# Patient Record
Sex: FEMALE | HISPANIC OR LATINO | Employment: STUDENT | URBAN - METROPOLITAN AREA
[De-identification: names, ages, dates, MRNs, and addresses within clinical notes are randomized per-mention and may not be internally consistent; named-entity substitution may affect disease eponyms.]

---

## 2018-04-21 ENCOUNTER — APPOINTMENT (OUTPATIENT)
Dept: RADIOLOGY | Facility: MEDICAL CENTER | Age: 18
End: 2018-04-21
Attending: EMERGENCY MEDICINE
Payer: MEDICAID

## 2018-04-21 ENCOUNTER — HOSPITAL ENCOUNTER (EMERGENCY)
Facility: MEDICAL CENTER | Age: 18
End: 2018-04-21
Attending: EMERGENCY MEDICINE
Payer: MEDICAID

## 2018-04-21 VITALS
HEART RATE: 70 BPM | RESPIRATION RATE: 16 BRPM | OXYGEN SATURATION: 97 % | SYSTOLIC BLOOD PRESSURE: 109 MMHG | DIASTOLIC BLOOD PRESSURE: 71 MMHG | TEMPERATURE: 99.1 F | WEIGHT: 114.2 LBS

## 2018-04-21 DIAGNOSIS — R10.13 EPIGASTRIC PAIN: ICD-10-CM

## 2018-04-21 LAB
ALBUMIN SERPL BCP-MCNC: 4.1 G/DL (ref 3.2–4.9)
ALBUMIN/GLOB SERPL: 1.3 G/DL
ALP SERPL-CCNC: 39 U/L (ref 30–99)
ALT SERPL-CCNC: 14 U/L (ref 2–50)
ANION GAP SERPL CALC-SCNC: 9 MMOL/L (ref 0–11.9)
APPEARANCE UR: ABNORMAL
AST SERPL-CCNC: 19 U/L (ref 12–45)
BACTERIA #/AREA URNS HPF: ABNORMAL /HPF
BASOPHILS # BLD AUTO: 0.3 % (ref 0–1.8)
BASOPHILS # BLD: 0.02 K/UL (ref 0–0.12)
BILIRUB SERPL-MCNC: 0.6 MG/DL (ref 0.1–1.5)
BILIRUB UR QL STRIP.AUTO: NEGATIVE
BUN SERPL-MCNC: 17 MG/DL (ref 8–22)
CALCIUM SERPL-MCNC: 9.3 MG/DL (ref 8.5–10.5)
CHLORIDE SERPL-SCNC: 106 MMOL/L (ref 96–112)
CO2 SERPL-SCNC: 25 MMOL/L (ref 20–33)
COLOR UR: YELLOW
CREAT SERPL-MCNC: 0.8 MG/DL (ref 0.5–1.4)
EOSINOPHIL # BLD AUTO: 0.1 K/UL (ref 0–0.51)
EOSINOPHIL NFR BLD: 1.4 % (ref 0–6.9)
EPI CELLS #/AREA URNS HPF: NEGATIVE /HPF
ERYTHROCYTE [DISTWIDTH] IN BLOOD BY AUTOMATED COUNT: 41.3 FL (ref 35.9–50)
GLOBULIN SER CALC-MCNC: 3.2 G/DL (ref 1.9–3.5)
GLUCOSE SERPL-MCNC: 108 MG/DL (ref 65–99)
GLUCOSE UR STRIP.AUTO-MCNC: NEGATIVE MG/DL
HCG SERPL QL: NEGATIVE
HCT VFR BLD AUTO: 40.8 % (ref 37–47)
HGB BLD-MCNC: 14 G/DL (ref 12–16)
HYALINE CASTS #/AREA URNS LPF: ABNORMAL /LPF
IMM GRANULOCYTES # BLD AUTO: 0.02 K/UL (ref 0–0.11)
IMM GRANULOCYTES NFR BLD AUTO: 0.3 % (ref 0–0.9)
KETONES UR STRIP.AUTO-MCNC: NEGATIVE MG/DL
LEUKOCYTE ESTERASE UR QL STRIP.AUTO: ABNORMAL
LIPASE SERPL-CCNC: 20 U/L (ref 11–82)
LYMPHOCYTES # BLD AUTO: 1.91 K/UL (ref 1–4.8)
LYMPHOCYTES NFR BLD: 26.4 % (ref 22–41)
MCH RBC QN AUTO: 31 PG (ref 27–33)
MCHC RBC AUTO-ENTMCNC: 34.3 G/DL (ref 33.6–35)
MCV RBC AUTO: 90.3 FL (ref 81.4–97.8)
MICRO URNS: ABNORMAL
MONOCYTES # BLD AUTO: 0.37 K/UL (ref 0–0.85)
MONOCYTES NFR BLD AUTO: 5.1 % (ref 0–13.4)
NEUTROPHILS # BLD AUTO: 4.81 K/UL (ref 2–7.15)
NEUTROPHILS NFR BLD: 66.5 % (ref 44–72)
NITRITE UR QL STRIP.AUTO: POSITIVE
NRBC # BLD AUTO: 0 K/UL
NRBC BLD-RTO: 0 /100 WBC
PH UR STRIP.AUTO: 6 [PH]
PLATELET # BLD AUTO: 267 K/UL (ref 164–446)
PMV BLD AUTO: 10.6 FL (ref 9–12.9)
POTASSIUM SERPL-SCNC: 3.8 MMOL/L (ref 3.6–5.5)
PROT SERPL-MCNC: 7.3 G/DL (ref 6–8.2)
PROT UR QL STRIP: NEGATIVE MG/DL
RBC # BLD AUTO: 4.52 M/UL (ref 4.2–5.4)
RBC # URNS HPF: ABNORMAL /HPF
RBC UR QL AUTO: NEGATIVE
SODIUM SERPL-SCNC: 140 MMOL/L (ref 135–145)
SP GR UR STRIP.AUTO: 1.02
UROBILINOGEN UR STRIP.AUTO-MCNC: 0.2 MG/DL
WBC # BLD AUTO: 7.2 K/UL (ref 4.8–10.8)
WBC #/AREA URNS HPF: ABNORMAL /HPF

## 2018-04-21 PROCEDURE — 76705 ECHO EXAM OF ABDOMEN: CPT

## 2018-04-21 PROCEDURE — 80053 COMPREHEN METABOLIC PANEL: CPT

## 2018-04-21 PROCEDURE — 81001 URINALYSIS AUTO W/SCOPE: CPT

## 2018-04-21 PROCEDURE — 87077 CULTURE AEROBIC IDENTIFY: CPT

## 2018-04-21 PROCEDURE — 85025 COMPLETE CBC W/AUTO DIFF WBC: CPT

## 2018-04-21 PROCEDURE — 83690 ASSAY OF LIPASE: CPT

## 2018-04-21 PROCEDURE — 99285 EMERGENCY DEPT VISIT HI MDM: CPT

## 2018-04-21 PROCEDURE — 96376 TX/PRO/DX INJ SAME DRUG ADON: CPT

## 2018-04-21 PROCEDURE — 700102 HCHG RX REV CODE 250 W/ 637 OVERRIDE(OP): Performed by: EMERGENCY MEDICINE

## 2018-04-21 PROCEDURE — 96374 THER/PROPH/DIAG INJ IV PUSH: CPT

## 2018-04-21 PROCEDURE — 36415 COLL VENOUS BLD VENIPUNCTURE: CPT

## 2018-04-21 PROCEDURE — A9270 NON-COVERED ITEM OR SERVICE: HCPCS | Performed by: EMERGENCY MEDICINE

## 2018-04-21 PROCEDURE — 87186 SC STD MICRODIL/AGAR DIL: CPT

## 2018-04-21 PROCEDURE — 700111 HCHG RX REV CODE 636 W/ 250 OVERRIDE (IP): Performed by: EMERGENCY MEDICINE

## 2018-04-21 PROCEDURE — 87086 URINE CULTURE/COLONY COUNT: CPT

## 2018-04-21 PROCEDURE — 84703 CHORIONIC GONADOTROPIN ASSAY: CPT

## 2018-04-21 PROCEDURE — 700105 HCHG RX REV CODE 258: Performed by: EMERGENCY MEDICINE

## 2018-04-21 RX ORDER — ONDANSETRON 2 MG/ML
4 INJECTION INTRAMUSCULAR; INTRAVENOUS ONCE
Status: COMPLETED | OUTPATIENT
Start: 2018-04-21 | End: 2018-04-21

## 2018-04-21 RX ORDER — ONDANSETRON 4 MG/1
4 TABLET, FILM COATED ORAL EVERY 8 HOURS PRN
Qty: 6 TAB | Refills: 1 | Status: SHIPPED | OUTPATIENT
Start: 2018-04-21 | End: 2020-09-13

## 2018-04-21 RX ORDER — SODIUM CHLORIDE 9 MG/ML
1000 INJECTION, SOLUTION INTRAVENOUS ONCE
Status: COMPLETED | OUTPATIENT
Start: 2018-04-21 | End: 2018-04-21

## 2018-04-21 RX ADMIN — LIDOCAINE HYDROCHLORIDE 30 ML: 20 SOLUTION OROPHARYNGEAL at 09:58

## 2018-04-21 RX ADMIN — SODIUM CHLORIDE 1000 ML: 9 INJECTION, SOLUTION INTRAVENOUS at 12:15

## 2018-04-21 RX ADMIN — ONDANSETRON 4 MG: 2 INJECTION, SOLUTION INTRAMUSCULAR; INTRAVENOUS at 14:06

## 2018-04-21 RX ADMIN — ONDANSETRON 4 MG: 2 INJECTION, SOLUTION INTRAMUSCULAR; INTRAVENOUS at 10:42

## 2018-04-21 ASSESSMENT — PAIN SCALES - GENERAL
PAINLEVEL_OUTOF10: 2
PAINLEVEL_OUTOF10: 8
PAINLEVEL_OUTOF10: 6
PAINLEVEL_OUTOF10: 4

## 2018-04-21 NOTE — ED NOTES
Pt verbalized relief of n/v. States much better.  Discharge instructions given to pt including returning in 24 hrs for persistent pain.  Questions answered by RN. Discharged w/steady gait and stable vitals.

## 2018-04-21 NOTE — DISCHARGE INSTRUCTIONS
Abdominal Pain, Possible Early Appendicitis  Abdominal (belly) pain can be caused by many things. Your caregiver decides the seriousness of your pain by an exam and possibly blood tests and X-rays. Many cases can be observed and treated at home. Most abdominal pain in children is functional. This means it is not caused by a disease. It will probably improve without treatment.  At this time, your caregiver feels that the abdominal pain could possibly be caused by early appendicitis. This means that you will require follow-up. You may be allowed to go home but may need to return for re-examination and repeat lab work.   HOME CARE INSTRUCTIONS   · Do not take or give laxatives unless directed by your caregiver.   · Take pain medication only if ordered by your caregiver.   · Take no food or water by mouth unless instructed to do so by your caregiver.   SEEK IMMEDIATE MEDICAL CARE IF:   · The pain does not go away or becomes much worse.   · An oral temperature above 102° F (38.9° C) develops.   · Repeated vomiting occurs.   · Blood is being passed in stools (bright red or black tarry stools).   · You develop blood in the urine or cannot pass your urine.   · You develop severe pain in other parts of your body.   MAKE SURE YOU:   · Understand these instructions.   · Will watch your condition.   · Will get help right away if you are not doing well or get worse.   Document Released: 01/06/2009 Document Revised: 03/11/2013 Document Reviewed: 01/06/2009  [facility] Patient Information ©2013 [employer].

## 2018-04-21 NOTE — ED NOTES
Pt verbalized relief of epigastric pain, states that her mid abdomen still hurts and still has the nausea. Will notify erp.

## 2018-04-21 NOTE — ED PROVIDER NOTES
ED Provider Note    Scribed for Julianna Rodriguez M.D. by Kerri Rodriguez. 4/21/2018, 9:41 AM.    Primary care provider: No primary care provider on file.  Means of arrival: Walk in   History obtained from: patient   History limited by: none       CHIEF COMPLAINT  Chief Complaint   Patient presents with   • Abdominal Pain     Pt reports mid abd pain when pt woke today/ descibed as a cramp that comes and goes/ pt reports +vomiting a couple times as well   • N/V       HPI  Jenn Sky is a 17 y.o. female who presents to the Emergency Department for evaluation of intermittent sharp and burning upper abdominal pain onset two hours ago. Patient states her pain was initially to her umbilical area but has since spread to her upper abdomen where her pain is currently the greatest. She denies radiation of her pain to her lower abdomen. Her abdominal pain is not exacerbated with eating. Patient confirms feeling normal yesterday and reports no recent intake of abnormal foods. She has associated vomiting. Patient denies diarrhea, vaginal discharge and bleeding and pain with urination. Patient has no concern for STDs at this time. Her last menstrual period ended a few days ago.       REVIEW OF SYSTEMS  Pertinent positives include upper abdominal pain, vomiting.   Pertinent negatives include no diarrhea, vaginal discharge, dysuria, diarrhea.   All other systems reviewed and negative. C.       PAST MEDICAL HISTORY  No history of UTI or STDs      SURGICAL HISTORY  patient denies any surgical history      SOCIAL HISTORY  Accompanied by boyfriend.       FAMILY HISTORY  None noted       CURRENT MEDICATIONS  Home Medications    **Home medications have not yet been reviewed for this encounter**         ALLERGIES  No Known Allergies        PHYSICAL EXAM  VITAL SIGNS: /71   Pulse (!) 118   Temp 37.3 °C (99.1 °F)   Resp 20   Wt 51.8 kg (114 lb 3.2 oz)   SpO2 99%   Constitutional:   Sitting up on gurney and appears  uncomfortable, able to answer questions  HENT: Nose is normal in appearance without rhinorrhea, external ears are normal,  moist mucous membranes  Eyes: Anicteric,  pupils are equal round and reactive, there is no conjunctival drainage or pallor   Neck: The trachea is midline, there is no obvious mass or meningeal signs  Cardiovascular: Equal radial pulsation, regular rate and rhythm without murmurs gallops or rubs  Thorax & Lungs: Respiratory rate and effort are normal. There is normal chest excursion with respiration.  No wheezes rhonchi or rales noted.  Abdomen: Abdomen is normal in appearance,  normal bowel sounds, no pain with cough, nonpulsatile  :  No CVA tenderness to palpation  Musculoskeletal: No deformities noted in all 4 extremities. Actively moves all 4 extremities  Skin: Visualized skin is warm, no erythema, no rash.  Neurologic:  Cranial nerves II through XII are intact there is no focal abnormality noted.  Psychiatric: Normal mood and mentation      DIAGNOSTIC STUDIES / PROCEDURES  LABS  Results for orders placed or performed during the hospital encounter of 04/21/18   CBC WITH DIFFERENTIAL   Result Value Ref Range    WBC 7.2 4.8 - 10.8 K/uL    RBC 4.52 4.20 - 5.40 M/uL    Hemoglobin 14.0 12.0 - 16.0 g/dL    Hematocrit 40.8 37.0 - 47.0 %    MCV 90.3 81.4 - 97.8 fL    MCH 31.0 27.0 - 33.0 pg    MCHC 34.3 33.6 - 35.0 g/dL    RDW 41.3 35.9 - 50.0 fL    Platelet Count 267 164 - 446 K/uL    MPV 10.6 9.0 - 12.9 fL    Neutrophils-Polys 66.50 44.00 - 72.00 %    Lymphocytes 26.40 22.00 - 41.00 %    Monocytes 5.10 0.00 - 13.40 %    Eosinophils 1.40 0.00 - 6.90 %    Basophils 0.30 0.00 - 1.80 %    Immature Granulocytes 0.30 0.00 - 0.90 %    Nucleated RBC 0.00 /100 WBC    Neutrophils (Absolute) 4.81 2.00 - 7.15 K/uL    Lymphs (Absolute) 1.91 1.00 - 4.80 K/uL    Monos (Absolute) 0.37 0.00 - 0.85 K/uL    Eos (Absolute) 0.10 0.00 - 0.51 K/uL    Baso (Absolute) 0.02 0.00 - 0.12 K/uL    Immature Granulocytes (abs)  0.02 0.00 - 0.11 K/uL    NRBC (Absolute) 0.00 K/uL   COMP METABOLIC PANEL   Result Value Ref Range    Sodium 140 135 - 145 mmol/L    Potassium 3.8 3.6 - 5.5 mmol/L    Chloride 106 96 - 112 mmol/L    Co2 25 20 - 33 mmol/L    Anion Gap 9.0 0.0 - 11.9    Glucose 108 (H) 65 - 99 mg/dL    Bun 17 8 - 22 mg/dL    Creatinine 0.80 0.50 - 1.40 mg/dL    Calcium 9.3 8.5 - 10.5 mg/dL    AST(SGOT) 19 12 - 45 U/L    ALT(SGPT) 14 2 - 50 U/L    Alkaline Phosphatase 39 30 - 99 U/L    Total Bilirubin 0.6 0.1 - 1.5 mg/dL    Albumin 4.1 3.2 - 4.9 g/dL    Total Protein 7.3 6.0 - 8.2 g/dL    Globulin 3.2 1.9 - 3.5 g/dL    A-G Ratio 1.3 g/dL   LIPASE   Result Value Ref Range    Lipase 20 11 - 82 U/L   BETA-HCG QUALITATIVE SERUM   Result Value Ref Range    Beta-Hcg Qualitative Serum Negative Negative   URINALYSIS,CULTURE IF INDICATED   Result Value Ref Range    Color Yellow     Character Cloudy (A)     Specific Gravity 1.022 <1.035    Ph 6.0 5.0 - 8.0    Glucose Negative Negative mg/dL    Ketones Negative Negative mg/dL    Protein Negative Negative mg/dL    Bilirubin Negative Negative    Urobilinogen, Urine 0.2 Negative    Nitrite Positive (A) Negative    Leukocyte Esterase Small (A) Negative    Occult Blood Negative Negative    Micro Urine Req Microscopic    ESTIMATED GFR   Result Value Ref Range    GFR If African American >60 >60 mL/min/1.73 m 2    GFR If Non African American >60 >60 mL/min/1.73 m 2   URINE MICROSCOPIC (W/UA)   Result Value Ref Range    WBC 20-50 (A) /hpf    RBC 0-2 /hpf    Bacteria Many (A) None /hpf    Epithelial Cells Negative /hpf    Hyaline Cast 6-10 (A) /lpf   All labs reviewed by me.        RADIOLOGY  US-PELVIC LIMITED APPY   Final Result      1.  No sonographic evidence of appendicitis.      2.  Trace amount of free fluid identified.      US-GALLBLADDER   Final Result      Unremarkable gallbladder ultrasound.         The radiologist's interpretation of all radiological studies have been reviewed by me.      COURSE  & MEDICAL DECISION MAKING  Nursing notes and vital signs were reviewed. (See chart for details)  The patient's  records were reviewed, history was obtained from the patient.     The patient presents with abdominal pain, and the differential diagnosis includes but is not limited to early appendicitis.       9:41 AM Initial orders in the Emergency Department included US pelvic, US gallbladder, estimated GFR, UA, beta-hcg qualitative, CBC, CMP and lipase.     11:50 AM Patient reevaluated at bedside. The patient has complained of dizziness and dehydration. Ordered IV fluids secondary to patients complaints of dizziness.     12:00 PM Patient reevaluated at bedside. She continues to complain of upper abdominal pain.     1:27 PM Patient reevaluated at bedside. Patient is resting comfortably and confirms feeling improved after IV fluids.  Although she reports having some diarrhea. She agreed to discharge home at this time but understands to return with any persistent lower right abdominal pain.     The patient was discharged home with an information sheet on epigastric pain and told to return immediately for any signs or symptoms listed, but specifically if she has persistent with lower abdominal pain.  The patient agreed to the discharge precautions and follow-up plan which is documented in EPIC.      DISPOSITION:  Patient will be discharged home in stable condition.      FOLLOW UP:  [facility], Emergency Dept  [address] [facility] [address] [phone]  In 1 day  if pain      OUTPATIENT MEDICATIONS:  New Prescriptions    ONDANSETRON (ZOFRAN) 4 MG TAB TABLET    Take 1 Tab by mouth every 8 hours as needed.       FINAL IMPRESSION  1. Epigastric pain         Kerri MACARIO), am scribing for, and in the presence of, Julianna Rodriguez M.D..  Electronically signed by: Kerri Brush), 4/21/2018  Julianna MACARIO M.D. personally performed the services described in  this documentation, as scribed by Kerri Rodriguez in my presence, and it is both accurate and complete.    The note accurately reflects work and decisions made by me.  Julianna Rodriguez  4/21/2018  3:31 PM

## 2018-04-21 NOTE — ED NOTES
Pt states that she is feeling better and not as dizzy as she was. States that she still has the nausea but no c/o vomiting.

## 2018-04-21 NOTE — ED NOTES
Pt ambulated to room w/steady gait. Chart up for erp to see.  Agree with triage notes. Describes pain in abdomen as cramping and radiates to her epigastric/substernal chest area.  Denies urinary discomfort. States that she is unable to keep anything po.

## 2018-04-21 NOTE — ED TRIAGE NOTES
Chief Complaint   Patient presents with   • Abdominal Pain     Pt reports mid abd pain when pt woke today/ descibed as a cramp that comes and goes/ pt reports +vomiting a couple times as well   • N/V     Explained to pt triage process, made pt aware to tell this RN of any changes/concerns, pt verbalized understanding of process and instructions given. Pt to ER mauricio.

## 2018-04-21 NOTE — ED NOTES
1000  poc explained to pt. piv established by emt student supervised by RN. Blood collected and sent to lab.  Pt kept NPO.

## 2018-04-21 NOTE — ED NOTES
Pt states that she is feeling dizzy, erp notified. Tried ambulating to the restroom w/unsteady gait, pt assisted by RN using wheelchair in using the restroom.  Instructed to collect clean catch urine specimen. Urine collected sent to lab.

## 2018-04-23 LAB
BACTERIA UR CULT: ABNORMAL
BACTERIA UR CULT: ABNORMAL
SIGNIFICANT IND 70042: ABNORMAL
SITE SITE: ABNORMAL
SOURCE SOURCE: ABNORMAL

## 2018-04-23 NOTE — ED NOTES
ED Positive Culture Follow-up/Notification Note:    Date: 4/23/18     Patient seen in the ED on 4/21/2018 for intermittent sharp and burning upper abdominal pain x 2 hours. Denies pain with urination.  1. Epigastric pain       Discharge Medication List as of 4/21/2018  1:41 PM      START taking these medications    Details   ondansetron (ZOFRAN) 4 MG Tab tablet 4 mg, EVERY 8 HOURS PRN Starting Sat 4/21/2018, Disp-6 Tab, R-1, Oral             Allergies: Patient has no known allergies.     Vitals:    04/21/18 1100 04/21/18 1200 04/21/18 1300 04/21/18 1330   BP:       Pulse: 85 62 72 70   Resp: 20 18 16 16   Temp:       SpO2: 95% 97% 97% 97%   Weight:           Final cultures:   Results     Procedure Component Value Units Date/Time    URINE CULTURE(NEW) [[id number]]  (Abnormal)  (Susceptibility) Collected:  04/21/18 1140    Order Status:  Completed Specimen:  Urine Updated:  04/23/18 0744     Significant Indicator POS (POS)     Source UR     Site --     Urine Culture -- (A)      Escherichia coli  >100,000 cfu/mL   (A)    Culture & Susceptibility     ESCHERICHIA COLI     Antibiotic Sensitivity Microscan Unit Status    Ampicillin Sensitive <=8 mcg/mL Final    Cefepime Sensitive <=8 mcg/mL Final    Cefotaxime Sensitive <=2 mcg/mL Final    Cefotetan Sensitive <=16 mcg/mL Final    Ceftazidime Sensitive <=1 mcg/mL Final    Ceftriaxone Sensitive <=8 mcg/mL Final    Cefuroxime Sensitive <=4 mcg/mL Final    Cephalothin Intermediate 16 mcg/mL Final    Ciprofloxacin Sensitive <=1 mcg/mL Final    Gentamicin Sensitive <=4 mcg/mL Final    Levofloxacin Sensitive <=2 mcg/mL Final    Nitrofurantoin Sensitive <=32 mcg/mL Final    Pip/Tazobactam Sensitive <=16 mcg/mL Final    Piperacillin Sensitive <=16 mcg/mL Final    Tigecycline Sensitive <=2 mcg/mL Final    Tobramycin Sensitive <=4 mcg/mL Final    Trimeth/Sulfa Sensitive <=2/38 mcg/mL Final                       URINALYSIS,CULTURE IF INDICATED [[id number]]  (Abnormal) Collected:   04/21/18 1140    Order Status:  Completed Specimen:  Urine Updated:  04/21/18 1210     Color Yellow     Character Cloudy (A)     Specific Gravity 1.022     Ph 6.0     Glucose Negative mg/dL      Ketones Negative mg/dL      Protein Negative mg/dL      Bilirubin Negative     Urobilinogen, Urine 0.2     Nitrite Positive (A)     Leukocyte Esterase Small (A)     Occult Blood Negative     Micro Urine Req Microscopic          Plan:   Patient does not present with urinary symptoms at the time of visit. Urine culture result likely represents asymptomatic bacteriuria. No need to treat with antimicrobials at this time.     Jo-Ann Wayne

## 2019-01-23 ENCOUNTER — HOSPITAL ENCOUNTER (EMERGENCY)
Facility: MEDICAL CENTER | Age: 19
End: 2019-01-24
Attending: EMERGENCY MEDICINE
Payer: MEDICAID

## 2019-01-23 ENCOUNTER — APPOINTMENT (OUTPATIENT)
Dept: RADIOLOGY | Facility: MEDICAL CENTER | Age: 19
End: 2019-01-23
Attending: EMERGENCY MEDICINE
Payer: MEDICAID

## 2019-01-23 VITALS
OXYGEN SATURATION: 90 % | WEIGHT: 113.67 LBS | TEMPERATURE: 97.3 F | HEIGHT: 63 IN | RESPIRATION RATE: 19 BRPM | DIASTOLIC BLOOD PRESSURE: 76 MMHG | HEART RATE: 86 BPM | BODY MASS INDEX: 20.14 KG/M2 | SYSTOLIC BLOOD PRESSURE: 126 MMHG

## 2019-01-23 DIAGNOSIS — E87.6 HYPOKALEMIA: ICD-10-CM

## 2019-01-23 DIAGNOSIS — R00.2 PALPITATIONS: ICD-10-CM

## 2019-01-23 LAB
ALBUMIN SERPL BCP-MCNC: 4.5 G/DL (ref 3.2–4.9)
ALBUMIN/GLOB SERPL: 1.5 G/DL
ALP SERPL-CCNC: 41 U/L (ref 30–99)
ALT SERPL-CCNC: 15 U/L (ref 2–50)
ANION GAP SERPL CALC-SCNC: 10 MMOL/L (ref 0–11.9)
APTT PPP: 30.4 SEC (ref 24.7–36)
AST SERPL-CCNC: 20 U/L (ref 12–45)
BASOPHILS # BLD AUTO: 0.8 % (ref 0–1.8)
BASOPHILS # BLD: 0.06 K/UL (ref 0–0.12)
BILIRUB SERPL-MCNC: 0.5 MG/DL (ref 0.1–1.5)
BNP SERPL-MCNC: 7 PG/ML (ref 0–100)
BUN SERPL-MCNC: 12 MG/DL (ref 8–22)
CALCIUM SERPL-MCNC: 9.5 MG/DL (ref 8.5–10.5)
CHLORIDE SERPL-SCNC: 106 MMOL/L (ref 96–112)
CO2 SERPL-SCNC: 24 MMOL/L (ref 20–33)
CREAT SERPL-MCNC: 0.73 MG/DL (ref 0.5–1.4)
EOSINOPHIL # BLD AUTO: 0.16 K/UL (ref 0–0.51)
EOSINOPHIL NFR BLD: 2.2 % (ref 0–6.9)
ERYTHROCYTE [DISTWIDTH] IN BLOOD BY AUTOMATED COUNT: 42.6 FL (ref 35.9–50)
GLOBULIN SER CALC-MCNC: 3 G/DL (ref 1.9–3.5)
GLUCOSE SERPL-MCNC: 112 MG/DL (ref 65–99)
HCG SERPL QL: NEGATIVE
HCT VFR BLD AUTO: 45 % (ref 37–47)
HGB BLD-MCNC: 15 G/DL (ref 12–16)
IMM GRANULOCYTES # BLD AUTO: 0.02 K/UL (ref 0–0.11)
IMM GRANULOCYTES NFR BLD AUTO: 0.3 % (ref 0–0.9)
INR PPP: 1.09 (ref 0.87–1.13)
LIPASE SERPL-CCNC: 42 U/L (ref 11–82)
LYMPHOCYTES # BLD AUTO: 2.5 K/UL (ref 1–4.8)
LYMPHOCYTES NFR BLD: 33.8 % (ref 22–41)
MCH RBC QN AUTO: 30.7 PG (ref 27–33)
MCHC RBC AUTO-ENTMCNC: 33.3 G/DL (ref 33.6–35)
MCV RBC AUTO: 92 FL (ref 81.4–97.8)
MONOCYTES # BLD AUTO: 0.46 K/UL (ref 0–0.85)
MONOCYTES NFR BLD AUTO: 6.2 % (ref 0–13.4)
NEUTROPHILS # BLD AUTO: 4.19 K/UL (ref 2–7.15)
NEUTROPHILS NFR BLD: 56.7 % (ref 44–72)
NRBC # BLD AUTO: 0 K/UL
NRBC BLD-RTO: 0 /100 WBC
PLATELET # BLD AUTO: 328 K/UL (ref 164–446)
PMV BLD AUTO: 10.3 FL (ref 9–12.9)
POTASSIUM SERPL-SCNC: 3.4 MMOL/L (ref 3.6–5.5)
PROT SERPL-MCNC: 7.5 G/DL (ref 6–8.2)
PROTHROMBIN TIME: 14.2 SEC (ref 12–14.6)
RBC # BLD AUTO: 4.89 M/UL (ref 4.2–5.4)
SODIUM SERPL-SCNC: 140 MMOL/L (ref 135–145)
TROPONIN I SERPL-MCNC: <0.01 NG/ML (ref 0–0.04)
WBC # BLD AUTO: 7.4 K/UL (ref 4.8–10.8)

## 2019-01-23 PROCEDURE — 36415 COLL VENOUS BLD VENIPUNCTURE: CPT

## 2019-01-23 PROCEDURE — 85730 THROMBOPLASTIN TIME PARTIAL: CPT

## 2019-01-23 PROCEDURE — 71045 X-RAY EXAM CHEST 1 VIEW: CPT

## 2019-01-23 PROCEDURE — 80053 COMPREHEN METABOLIC PANEL: CPT

## 2019-01-23 PROCEDURE — 84703 CHORIONIC GONADOTROPIN ASSAY: CPT

## 2019-01-23 PROCEDURE — 85610 PROTHROMBIN TIME: CPT

## 2019-01-23 PROCEDURE — 83690 ASSAY OF LIPASE: CPT

## 2019-01-23 PROCEDURE — 83880 ASSAY OF NATRIURETIC PEPTIDE: CPT

## 2019-01-23 PROCEDURE — 84484 ASSAY OF TROPONIN QUANT: CPT

## 2019-01-23 PROCEDURE — 93005 ELECTROCARDIOGRAM TRACING: CPT | Performed by: EMERGENCY MEDICINE

## 2019-01-23 PROCEDURE — 99285 EMERGENCY DEPT VISIT HI MDM: CPT

## 2019-01-23 PROCEDURE — 85025 COMPLETE CBC W/AUTO DIFF WBC: CPT

## 2019-01-23 PROCEDURE — 93005 ELECTROCARDIOGRAM TRACING: CPT

## 2019-01-23 ASSESSMENT — ENCOUNTER SYMPTOMS
PALPITATIONS: 1
UNEXPECTED WEIGHT CHANGE: 0

## 2019-01-24 ENCOUNTER — PATIENT OUTREACH (OUTPATIENT)
Dept: HEALTH INFORMATION MANAGEMENT | Facility: OTHER | Age: 19
End: 2019-01-24

## 2019-01-24 LAB — EKG IMPRESSION: NORMAL

## 2019-01-24 ASSESSMENT — ENCOUNTER SYMPTOMS: SHORTNESS OF BREATH: 0

## 2019-01-24 NOTE — ED NOTES
Pt discharged safely from this ER w/VSS,Pt is A&Ox4 leaves this ER with a steady gait. PT given all discharge instructions and education with understanding and questions answered. Pt instructed to return to ER or call 911 if symptoms worsen. Pt states feels safe to be discharged and has all belongings in hand.

## 2019-01-24 NOTE — ED TRIAGE NOTES
"Chief Complaint   Patient presents with   • Palpitations     /76   Pulse 98   Temp 36.3 °C (97.3 °F) (Temporal)   Resp 18   Ht 1.6 m (5' 3\")   Wt 51.6 kg (113 lb 10.7 oz)   SpO2 99%   BMI 20.14 kg/m²     Pt has been taking potassium for hypokalemia, which she has not been taking, \"I has not been working\".  Pt states that she has been having palpitations that have been lasting longer and longer.     +dizzness and light headedness. +Nausea. -CP, +fatigue.     EKG ordered, protocol ordered.   "

## 2019-01-24 NOTE — ED TRIAGE NOTES
EKG done, blood drawn and sent to lab. Pt back out into lobby asked to notify staff of any changes.

## 2019-01-24 NOTE — ED PROVIDER NOTES
"ED Provider Note    Scribed for Radha Capellan M.D. by Leni Espinoza. 1/23/2019, 11:30 PM.    Primary care provider: Pcp Pt States None  Means of arrival: Walk in  History obtained from: Patient  History limited by: None     CHIEF COMPLAINT  Chief Complaint   Patient presents with   • Palpitations     HPI  Jenn Sky is a 18 y.o. female with a history of C. Diff 2 years ago who presents to the Emergency Department with chest palpitations onset 1 week ago. She reports experiencing palpitations since 2 years ago but states they have been lasting longer recently. Patient states she is currently on potassium for hypokalemia which she has not been compliant with regularly. She reports symptom exacerbation when laying down and with caffeine consumption. Patient reports acute stressors recently due to school starting again. Patient denies chest pain. Denies significant weight loss. She is not a smoker and denies drug use. She is currently menstruating.     REVIEW OF SYSTEMS  Review of Systems   Constitutional: Negative for unexpected weight change.   Respiratory: Negative for shortness of breath.    Cardiovascular: Positive for palpitations. Negative for chest pain.   All other systems reviewed and are negative.    PAST MEDICAL HISTORY  C. Diff     SURGICAL HISTORY  patient denies any surgical history    SOCIAL HISTORY  Social History   Substance Use Topics   • Smoking status: Never Smoker   • Smokeless tobacco: None   • Alcohol use No      History   Drug Use No       FAMILY HISTORY  No family history on file.    CURRENT MEDICATIONS  Reviewed.  See Encounter Summary.     ALLERGIES  No Known Allergies    PHYSICAL EXAM  VITAL SIGNS: /76   Pulse 98   Temp 36.3 °C (97.3 °F) (Temporal)   Resp 18   Ht 1.6 m (5' 3\")   Wt 51.6 kg (113 lb 10.7 oz)   SpO2 99%   BMI 20.14 kg/m²   Constitutional: Alert in no apparent distress. Thin but well appearing.   HENT: No signs of trauma, Bilateral external ears normal, Nose " normal.   Eyes: Pupils are equal and reactive, Conjunctiva normal, Non-icteric.   Neck: Normal range of motion, No tenderness, Supple, No stridor.   Cardiovascular: Tachycardic, Regular rhythm, no murmurs.   Thorax & Lungs: Normal breath sounds, No respiratory distress, No wheezing, No chest tenderness.   Abdomen: Soft, No tenderness, No masses, No pulsatile masses. No peritoneal signs.  Skin: Warm, Dry, No erythema, No rash.   Back: No bony tenderness, No CVA tenderness.   Extremities: Intact distal pulses, No edema, No tenderness, No cyanosis  Musculoskeletal: Good range of motion in all major joints. No tenderness to palpation or major deformities noted.   Neurologic: Alert , Normal motor function, Normal sensory function, No focal deficits noted.   Psychiatric: Affect normal, Judgment normal, Mood normal.     DIAGNOSTIC STUDIES / PROCEDURES     LABS  Labs Reviewed   CBC WITH DIFFERENTIAL - Abnormal; Notable for the following:        Result Value    MCHC 33.3 (*)     All other components within normal limits    Narrative:     Indicate which anticoagulants the patient is on:->UNKNOWN   COMP METABOLIC PANEL - Abnormal; Notable for the following:     Potassium 3.4 (*)     Glucose 112 (*)     All other components within normal limits    Narrative:     Indicate which anticoagulants the patient is on:->UNKNOWN   TROPONIN    Narrative:     Indicate which anticoagulants the patient is on:->UNKNOWN   BTYPE NATRIURETIC PEPTIDE    Narrative:     Indicate which anticoagulants the patient is on:->UNKNOWN   PROTHROMBIN TIME    Narrative:     Indicate which anticoagulants the patient is on:->UNKNOWN   APTT    Narrative:     Indicate which anticoagulants the patient is on:->UNKNOWN   LIPASE    Narrative:     Indicate which anticoagulants the patient is on:->UNKNOWN   ESTIMATED GFR    Narrative:     Indicate which anticoagulants the patient is on:->UNKNOWN   HCG QUAL SERUM   All labs were reviewed by me.    EKG  12 Lead EKG  interpreted by me to show:  Sinus tachycardia  Rate 105  Axis: Normal  Intervals: Normal  No ST elevation changes >1mm  Clinical Impression: Tachycardic, Otherwise normal EKG. No prior EKG for comparison.     RADIOLOGY  DX-CHEST-LIMITED (1 VIEW)   Final Result      No evidence of acute cardiopulmonary process.      The radiologist's interpretation of all radiological studies have been reviewed by me.    COURSE & MEDICAL DECISION MAKING  Pertinent Labs & Imaging studies reviewed. (See chart for details)    11:30 PM Patient seen and examined at bedside. This is a 18 y.o. year old female who presents with palpitations which is been intermittent over the past 2 years.  On my examination, she was well-appearing with normal vital signs.  EKG showed sinus tachycardia which had improved prior to my evaluation of the patient.  Differential diagnosis includes but is not limited to PVCs, dysrhythmia, dehydration, electrolyte abnormality, pregnancy, and less likely myocarditis, endocarditis, pericarditis    Per triage protocol, ordered DX chest, EKG, Beta-HCG, Estimated GFR, Troponin, CBC, CMP, BNP, and other labs to evaluate her symptoms. Based on her clinical presentation, her symptoms are likely due to premature ventricular complexes which may be exacerbated by caffeine consumption or stress. I recommended magnesium and removal of exacerbating factors for symptom relief. Also recommended follow up with cardiology for further work up. Called  to set up cardiology follow up appointment.     Labs were unremarkable without significant leukocytosis, anemia, or electrolyte disturbance aside from mild hypokalemia at 3.4.  Patient had no elevation in troponin or BNP which were ordered per triage protocol.  Pregnancy testing was negative.  Chest x-ray showed no acute cardiopulmonary process.    11:54 PM Patient reevaluated at bedside. Discussed unremarkable diagnostic results with patient. She will be discharged with  instructions on supportive care. Patient understands and is comfortable with plan. The patient will return for new or worsening symptoms and is stable at the time of discharge.     DISPOSITION:  Patient will be discharged home in stable condition.    FOLLOW UP:  [facility], Emergency Dept  [address] [facility] [id number] [phone]    If symptoms worsen    [facility]  [address] [facility] [address]  [phone]          OUTPATIENT MEDICATIONS:  Discharge Medication List as of 1/23/2019 11:56 PM        FINAL IMPRESSION  1. Palpitations    2. Hypokalemia         Leni MACARIO (Scribe), am scribing for, and in the presence of, Radha Capellan M.D..  Electronically signed by: Leni Espinoza ([place]), 1/23/2019  Radha MACARIO M.D. personally performed the services described in this documentation, as scribed by Leni Espinoza in my presence, and it is both accurate and complete. C.     The note accurately reflects work and decisions made by me.  Radha Capellan  1/24/2019  2:35 AM

## 2019-12-15 ENCOUNTER — OFFICE VISIT (OUTPATIENT)
Dept: URGENT CARE | Facility: CLINIC | Age: 19
End: 2019-12-15
Payer: MEDICAID

## 2019-12-15 VITALS
HEIGHT: 63 IN | TEMPERATURE: 99 F | RESPIRATION RATE: 16 BRPM | DIASTOLIC BLOOD PRESSURE: 68 MMHG | WEIGHT: 118 LBS | SYSTOLIC BLOOD PRESSURE: 112 MMHG | HEART RATE: 74 BPM | OXYGEN SATURATION: 98 % | BODY MASS INDEX: 20.91 KG/M2

## 2019-12-15 DIAGNOSIS — R10.9 LEFT FLANK PAIN: ICD-10-CM

## 2019-12-15 DIAGNOSIS — N39.0 URINARY TRACT INFECTION WITHOUT HEMATURIA, SITE UNSPECIFIED: ICD-10-CM

## 2019-12-15 PROCEDURE — 99203 OFFICE O/P NEW LOW 30 MIN: CPT | Performed by: FAMILY MEDICINE

## 2019-12-15 RX ORDER — NITROFURANTOIN 25; 75 MG/1; MG/1
100 CAPSULE ORAL 2 TIMES DAILY
Qty: 10 CAP | Refills: 0 | Status: SHIPPED | OUTPATIENT
Start: 2019-12-15 | End: 2019-12-20

## 2019-12-15 ASSESSMENT — ENCOUNTER SYMPTOMS
ABDOMINAL PAIN: 0
ANOREXIA: 0
MYALGIAS: 0
FEVER: 0
NAUSEA: 0
CHILLS: 0
BACK PAIN: 1
DIZZINESS: 0
SHORTNESS OF BREATH: 0
FLANK PAIN: 1
EYE PAIN: 0
VOMITING: 0
SORE THROAT: 0

## 2019-12-16 NOTE — PROGRESS NOTES
"Subjective:   Jenn Sky is a 19 y.o. female who presents for Back Pain (x2week, lower back pain, (R) hip pain. pain radiates to the side. )        19 y.o. female presents with flank pain, left side radiating to left anterior hip over the past 2 weeks.  Denies nausea, denies vomiting.  Denies pregnancy, estrogen implant via primary care provider.    Back Pain   Pertinent negatives include no abdominal pain, chest pain or fever.   UTI   This is a new (history of recurrent UTI, last episode 1 year prior) problem. Episode onset: 2 weeks. The problem occurs intermittently. The problem has been unchanged. Pertinent negatives include no abdominal pain, anorexia, chest pain, chills, fever, myalgias, nausea, rash, sore throat, urinary symptoms or vomiting. The symptoms are aggravated by bending. She has tried nothing for the symptoms.     Review of Systems   Constitutional: Negative for chills and fever.   HENT: Negative for sore throat.    Eyes: Negative for pain.   Respiratory: Negative for shortness of breath.    Cardiovascular: Negative for chest pain.   Gastrointestinal: Negative for abdominal pain, anorexia, nausea and vomiting.   Genitourinary: Positive for flank pain. Negative for hematuria.   Musculoskeletal: Positive for back pain. Negative for myalgias.   Skin: Negative for rash.   Neurological: Negative for dizziness.     No Known Allergies   Objective:   /68   Pulse 74   Temp 37.2 °C (99 °F) (Temporal)   Resp 16   Ht 1.6 m (5' 3\")   Wt 53.5 kg (118 lb)   SpO2 98%   BMI 20.90 kg/m²   Physical Exam  Vitals signs and nursing note reviewed.   Constitutional:       General: She is not in acute distress.     Appearance: She is well-developed.   HENT:      Head: Normocephalic and atraumatic.   Eyes:      Conjunctiva/sclera: Conjunctivae normal.      Pupils: Pupils are equal, round, and reactive to light.   Cardiovascular:      Rate and Rhythm: Normal rate and regular rhythm.      Heart sounds: No " murmur.   Pulmonary:      Effort: Pulmonary effort is normal. No respiratory distress.      Breath sounds: Normal breath sounds.   Abdominal:      General: There is no distension.      Palpations: Abdomen is soft.      Tenderness: There is tenderness. There is left CVA tenderness.   Musculoskeletal: Normal range of motion.   Skin:     General: Skin is warm and dry.   Neurological:      General: No focal deficit present.      Mental Status: She is alert and oriented to person, place, and time. Mental status is at baseline.      Gait: Gait (gait at baseline) normal.   Psychiatric:         Judgment: Judgment normal.     UA: glu negative, blood negative, nitrite positive, LE negative        Assessment/Plan:   1. Urinary tract infection without hematuria, site unspecified  - nitrofurantoin monohyd macro (MACROBID) 100 MG Cap; Take 1 Cap by mouth 2 times a day for 5 days.  Dispense: 10 Cap; Refill: 0    2. Left flank pain      Discussed close monitoring, return precautions, and supportive measures including maintaining adequate fluid hydration and caloric intake, relative rest and OTC symptom management including acetaminophen as needed for pain and/or fever.    Differential diagnosis, natural history, supportive care, and indications for immediate follow-up discussed.     Advised the patient to follow-up with the primary care physician for recheck, reevaluation, and consideration of further management.

## 2020-09-13 ENCOUNTER — APPOINTMENT (OUTPATIENT)
Dept: RADIOLOGY | Facility: MEDICAL CENTER | Age: 20
End: 2020-09-13
Attending: EMERGENCY MEDICINE
Payer: MEDICAID

## 2020-09-13 ENCOUNTER — HOSPITAL ENCOUNTER (EMERGENCY)
Facility: MEDICAL CENTER | Age: 20
End: 2020-09-14
Attending: EMERGENCY MEDICINE
Payer: MEDICAID

## 2020-09-13 DIAGNOSIS — R06.02 SOB (SHORTNESS OF BREATH): ICD-10-CM

## 2020-09-13 LAB
ALBUMIN SERPL BCP-MCNC: 4.7 G/DL (ref 3.2–4.9)
ALBUMIN/GLOB SERPL: 1.7 G/DL
ALP SERPL-CCNC: 44 U/L (ref 30–99)
ALT SERPL-CCNC: 8 U/L (ref 2–50)
ANION GAP SERPL CALC-SCNC: 13 MMOL/L (ref 7–16)
AST SERPL-CCNC: 12 U/L (ref 12–45)
BASOPHILS # BLD AUTO: 0.7 % (ref 0–1.8)
BASOPHILS # BLD: 0.05 K/UL (ref 0–0.12)
BILIRUB SERPL-MCNC: 0.7 MG/DL (ref 0.1–1.5)
BUN SERPL-MCNC: 13 MG/DL (ref 8–22)
CALCIUM SERPL-MCNC: 10.1 MG/DL (ref 8.5–10.5)
CHLORIDE SERPL-SCNC: 103 MMOL/L (ref 96–112)
CO2 SERPL-SCNC: 23 MMOL/L (ref 20–33)
CREAT SERPL-MCNC: 0.81 MG/DL (ref 0.5–1.4)
D DIMER PPP IA.FEU-MCNC: <0.27 UG/ML (FEU) (ref 0–0.5)
EKG IMPRESSION: NORMAL
EOSINOPHIL # BLD AUTO: 0.07 K/UL (ref 0–0.51)
EOSINOPHIL NFR BLD: 0.9 % (ref 0–6.9)
ERYTHROCYTE [DISTWIDTH] IN BLOOD BY AUTOMATED COUNT: 40.5 FL (ref 35.9–50)
GLOBULIN SER CALC-MCNC: 2.8 G/DL (ref 1.9–3.5)
GLUCOSE SERPL-MCNC: 111 MG/DL (ref 65–99)
HCG SERPL QL: NEGATIVE
HCT VFR BLD AUTO: 45.3 % (ref 37–47)
HGB BLD-MCNC: 15.2 G/DL (ref 12–16)
IMM GRANULOCYTES # BLD AUTO: 0.02 K/UL (ref 0–0.11)
IMM GRANULOCYTES NFR BLD AUTO: 0.3 % (ref 0–0.9)
LYMPHOCYTES # BLD AUTO: 2.26 K/UL (ref 1–4.8)
LYMPHOCYTES NFR BLD: 29.9 % (ref 22–41)
MCH RBC QN AUTO: 30.5 PG (ref 27–33)
MCHC RBC AUTO-ENTMCNC: 33.6 G/DL (ref 33.6–35)
MCV RBC AUTO: 90.8 FL (ref 81.4–97.8)
MONOCYTES # BLD AUTO: 0.39 K/UL (ref 0–0.85)
MONOCYTES NFR BLD AUTO: 5.2 % (ref 0–13.4)
NEUTROPHILS # BLD AUTO: 4.77 K/UL (ref 2–7.15)
NEUTROPHILS NFR BLD: 63 % (ref 44–72)
NRBC # BLD AUTO: 0 K/UL
NRBC BLD-RTO: 0 /100 WBC
PLATELET # BLD AUTO: 282 K/UL (ref 164–446)
PMV BLD AUTO: 10.7 FL (ref 9–12.9)
POTASSIUM SERPL-SCNC: 3.4 MMOL/L (ref 3.6–5.5)
PROT SERPL-MCNC: 7.5 G/DL (ref 6–8.2)
RBC # BLD AUTO: 4.99 M/UL (ref 4.2–5.4)
SODIUM SERPL-SCNC: 139 MMOL/L (ref 135–145)
TROPONIN T SERPL-MCNC: <6 NG/L (ref 6–19)
WBC # BLD AUTO: 7.6 K/UL (ref 4.8–10.8)

## 2020-09-13 PROCEDURE — 84703 CHORIONIC GONADOTROPIN ASSAY: CPT

## 2020-09-13 PROCEDURE — 85025 COMPLETE CBC W/AUTO DIFF WBC: CPT

## 2020-09-13 PROCEDURE — 93005 ELECTROCARDIOGRAM TRACING: CPT | Performed by: EMERGENCY MEDICINE

## 2020-09-13 PROCEDURE — 93005 ELECTROCARDIOGRAM TRACING: CPT

## 2020-09-13 PROCEDURE — 85379 FIBRIN DEGRADATION QUANT: CPT

## 2020-09-13 PROCEDURE — 71045 X-RAY EXAM CHEST 1 VIEW: CPT

## 2020-09-13 PROCEDURE — 80053 COMPREHEN METABOLIC PANEL: CPT

## 2020-09-13 PROCEDURE — 84484 ASSAY OF TROPONIN QUANT: CPT

## 2020-09-13 PROCEDURE — 99283 EMERGENCY DEPT VISIT LOW MDM: CPT

## 2020-09-13 ASSESSMENT — LIFESTYLE VARIABLES
EVER FELT BAD OR GUILTY ABOUT YOUR DRINKING: NO
DO YOU DRINK ALCOHOL: YES
EVER HAD A DRINK FIRST THING IN THE MORNING TO STEADY YOUR NERVES TO GET RID OF A HANGOVER: NO
TOTAL SCORE: 0
HAVE PEOPLE ANNOYED YOU BY CRITICIZING YOUR DRINKING: NO
TOTAL SCORE: 0
TOTAL SCORE: 0
CONSUMPTION TOTAL: INCOMPLETE
HAVE YOU EVER FELT YOU SHOULD CUT DOWN ON YOUR DRINKING: NO

## 2020-09-13 ASSESSMENT — FIBROSIS 4 INDEX: FIB4 SCORE: 0.35

## 2020-09-14 VITALS
SYSTOLIC BLOOD PRESSURE: 122 MMHG | TEMPERATURE: 99 F | DIASTOLIC BLOOD PRESSURE: 77 MMHG | BODY MASS INDEX: 20.31 KG/M2 | OXYGEN SATURATION: 99 % | WEIGHT: 114.64 LBS | HEART RATE: 75 BPM | RESPIRATION RATE: 14 BRPM | HEIGHT: 63 IN

## 2020-09-14 NOTE — ED PROVIDER NOTES
ED Provider Note    Scribed for Tate Mason M.D. by Zen Orellana. 9/13/2020  10:04 PM    Primary care provider: None noted  Means of arrival: Walk-in  History obtained from: Patient  History limited by: None    CHIEF COMPLAINT  Chief Complaint   Patient presents with   • Shortness of Breath     Started Yesterday       HPI  Jenn Sky is a 20 y.o. female who presents to the Emergency Department for shortness of breath onset yesterday. Patient adds that she began experiencing constant chest pain as well as light headedness onset last night. Patient notes that she has been experiencing a cough. Patient notes that her symptoms are exacerbated upon exertion. Patient denies any hemoptysis, swelling in legs, fevers, or loss of consciousness. Patient denies any history of blood clots, lung problems, heart surgery, or chest surgery. Patient notes no alleviating factors.   PPE Note: I personally donned full PPE for all patient encounters during this visit, including being clean-shaven with an N95 respirator mask, gloves, and goggles.     Scribe remained outside the patient's room and did not have any contact with the patient for the duration of patient encounter.     REVIEW OF SYSTEMS  Pertinent positives include: shortness of breath, chest pain, light headedness, and cough. Pertinent negatives include: hemoptysis, swelling in legs, fevers, or loss of consciousness. See history of present illness. All other systems are negative.     PAST MEDICAL HISTORY  None noted    SURGICAL HISTORY  patient denies any surgical history    SOCIAL HISTORY  Social History     Tobacco Use   • Smoking status: Never Smoker   • Smokeless tobacco: Never Used   Substance Use Topics   • Alcohol use: No   • Drug use: No      Social History     Substance and Sexual Activity   Drug Use No       FAMILY HISTORY  Family History   Problem Relation Age of Onset   • Asthma Sister        CURRENT MEDICATIONS  Home Medications     Reviewed by Bubba  "LIV Mckenna R.N. (Registered Nurse) on 09/13/20 at 2137  Med List Status: Partial   Medication Last Dose Status        Patient Burke Taking any Medications                       ALLERGIES  No Known Allergies    PHYSICAL EXAM  VITAL SIGNS: /77   Pulse (!) 104   Temp 37.2 °C (99 °F) (Temporal)   Resp 14   Ht 1.6 m (5' 3\")   Wt 52 kg (114 lb 10.2 oz)   SpO2 97%   BMI 20.31 kg/m²   Constitutional: Alert in no apparent distress.  HENT: No signs of trauma, Bilateral external ears normal, Nose normal. Uvula midline.   Eyes: Pupils are equal and reactive, Conjunctiva normal, Non-icteric.   Neck: Normal range of motion, No tenderness, Supple, No stridor.   Lymphatic: No lymphadenopathy noted.   Cardiovascular: Regular rate and rhythm, no murmurs.   Thorax & Lungs: Normal breath sounds, No respiratory distress, No wheezing, No chest tenderness.   Abdomen:  Soft, No tenderness, No peritoneal signs, No masses, No pulsatile masses.   Skin: Warm, Dry, No erythema, No rash.   Back: No bony tenderness, No CVA tenderness.   Extremities: 2+ peripheral pulses, no lower extremity edema. Intact distal pulses, No tenderness, No cyanosis.  Musculoskeletal: Good range of motion in all major joints. No tenderness to palpation or major deformities noted.   Neurologic: Alert , Normal motor function, Normal sensory function, No focal deficits noted.   Psychiatric: Affect normal, Judgment normal, Mood normal.     DIAGNOSTIC STUDIES / PROCEDURES    LABS  Labs Reviewed   COMP METABOLIC PANEL - Abnormal; Notable for the following components:       Result Value    Potassium 3.4 (*)     Glucose 111 (*)     All other components within normal limits    Narrative:     1. Age less then 50  2. Heart reate less then 100  3. 02 sat > 94%  4. No prior history of DVT or PE  5. No recent trauma or surgery (2 weeks)  6. No hemoptisis.  7. No  or HRP  8. No un-lateral leg swelling.   CBC WITH DIFFERENTIAL    Narrative:     1. Age " less then 50  2. Heart reate less then 100  3. 02 sat > 94%  4. No prior history of DVT or PE  5. No recent trauma or surgery (2 weeks)  6. No hemoptisis.  7. No  or HRP  8. No un-lateral leg swelling.   D-DIMER    Narrative:     1. Age less then 50  2. Heart reate less then 100  3. 02 sat > 94%  4. No prior history of DVT or PE  5. No recent trauma or surgery (2 weeks)  6. No hemoptisis.  7. No  or HRP  8. No un-lateral leg swelling.   HCG QUAL SERUM    Narrative:     1. Age less then 50  2. Heart reate less then 100  3. 02 sat > 94%  4. No prior history of DVT or PE  5. No recent trauma or surgery (2 weeks)  6. No hemoptisis.  7. No  or HRP  8. No un-lateral leg swelling.   TROPONIN    Narrative:     1. Age less then 50  2. Heart reate less then 100  3. 02 sat > 94%  4. No prior history of DVT or PE  5. No recent trauma or surgery (2 weeks)  6. No hemoptisis.  7. No  or HRP  8. No un-lateral leg swelling.   ESTIMATED GFR    Narrative:     1. Age less then 50  2. Heart reate less then 100  3. 02 sat > 94%  4. No prior history of DVT or PE  5. No recent trauma or surgery (2 weeks)  6. No hemoptisis.  7. No  or HRP  8. No un-lateral leg swelling.      All labs reviewed by me.    EKG  Rate in chart  12 Lead EKG interpreted by me to show:  Indication: Chest pain  Normal sinus rhythm  Rate: 96  Axis: Normal  Intervals: Normal  Normal T waves  Normal ST segments  My impression of this EKG: Compared last EKG, no significant change.     RADIOLOGY  DX-CHEST-PORTABLE (1 VIEW)   Final Result      No acute cardiac or pulmonary abnormalities are identified.        The radiologist's interpretation of all radiological studies have been reviewed by me.    COURSE & MEDICAL DECISION MAKING  Nursing notes, FADUMO PMSFHx reviewed in chart.    20 y.o. female p/w chief complaint of shortness of breath.    10:04 PM Patient seen and examined at bedside. Labs and imaging ordered for further evaluation of patient's  symptoms.    I verified that the patient was wearing a mask and I was wearing appropriate PPE every time I entered the room. The patient's mask was on the patient at all times during my encounter except for a brief view of the oropharynx.     The differential diagnoses include but are not limited to:   #Shortness of breath  PTX:No e/o PTX on CXR and pt w/ b/l BS  No e/o pna on CXR and pt afebrile  No Chest pain or chest pressure to suggest ACS  PT is not PERC neg  Pt is low risk by Wells Criteria, neg DDimer, doubt PE  COVID test pending at time of discharge and patient encouraged to self quarantine for >10 days and sx free  unclear etiology of shortness of breath at this time however pt agrees to f/u w/ PCP or return emergently to ED    12:22 AM Patient was reevaluated at bedside. Discussed lab and radiology results with the patient and informed them that they are to be discharged. Patient verbalized her understanding and agreement with the plan of discharge. Patient agreed to follow up with primary care provider and will call him either Monday or Tuesday.    The patient will return for new or worsening symptoms and is stable at the time of discharge.    The patient is referred to a primary physician for blood pressure management, diabetic screening, and for all other preventative health concerns.    DISPOSITION:  Patient will be discharged home in stable condition.    FOLLOW UP:  [facility], Emergency Dept  [address] [id number] [phone]    If symptoms worsen    [address] [id number] [phone]  In 1 week        OUTPATIENT MEDICATIONS:  There are no discharge medications for this patient.        FINAL IMPRESSION  1. SOB (shortness of breath)          Zen MACARIO (Kandy), am scribing for, and in the presence of, Tate Mason M.D..    Electronically signed by: Zen Orellana (Kandy), 9/13/2020    Tate AMCARIO  MO Mason. personally performed the services described in this documentation, as scribed by Zen Orellana in my presence, and it is both accurate and complete. C    The note accurately reflects work and decisions made by me.  Tate Mason M.D.  9/14/2020  7:01 AM

## 2020-09-14 NOTE — ED TRIAGE NOTES
"Jenn Sky   20 y.o. female     Chief Complaint   Patient presents with   • Shortness of Breath     Started Yesterday      Pt amb to triage with steady gait for above complaint.     Started having trouble breathing yesterday.  Patient was buying a car at the time.  Out of breath after walking up 3 flights of stairs.     Pt is alert and oriented, speaking in full sentences, follows commands and responds appropriately to questions. NAD. Resp are even and unlabored.     Pt placed in lobby. Pt educated on triage process. Pt encouraged to alert staff for any changes.    /77   Pulse (!) 104   Temp 37.2 °C (99 °F) (Temporal)   Resp 14   Ht 1.6 m (5' 3\")   Wt 52 kg (114 lb 10.2 oz)   SpO2 97%   BMI 20.31 kg/m²     "

## 2021-03-10 ENCOUNTER — NURSE TRIAGE (OUTPATIENT)
Dept: HEALTH INFORMATION MANAGEMENT | Facility: OTHER | Age: 21
End: 2021-03-10

## 2021-03-10 DIAGNOSIS — Z23 NEED FOR VACCINATION: Primary | ICD-10-CM

## 2021-03-12 ENCOUNTER — IMMUNIZATION (OUTPATIENT)
Dept: FAMILY PLANNING/WOMEN'S HEALTH CLINIC | Facility: IMMUNIZATION CENTER | Age: 21
End: 2021-03-12
Attending: INTERNAL MEDICINE
Payer: MEDICAID

## 2021-03-12 DIAGNOSIS — Z23 NEED FOR VACCINATION: ICD-10-CM

## 2021-03-12 DIAGNOSIS — Z23 ENCOUNTER FOR VACCINATION: Primary | ICD-10-CM

## 2021-03-12 PROCEDURE — 0011A MODERNA SARS-COV-2 VACCINE: CPT

## 2021-03-12 PROCEDURE — 91301 MODERNA SARS-COV-2 VACCINE: CPT

## 2021-04-10 ENCOUNTER — IMMUNIZATION (OUTPATIENT)
Dept: FAMILY PLANNING/WOMEN'S HEALTH CLINIC | Facility: IMMUNIZATION CENTER | Age: 21
End: 2021-04-10
Attending: INTERNAL MEDICINE
Payer: COMMERCIAL

## 2021-04-10 DIAGNOSIS — Z23 ENCOUNTER FOR VACCINATION: Primary | ICD-10-CM

## 2021-04-10 PROCEDURE — 91301 MODERNA SARS-COV-2 VACCINE: CPT

## 2021-04-10 PROCEDURE — 0012A MODERNA SARS-COV-2 VACCINE: CPT

## 2021-09-09 ENCOUNTER — OFFICE VISIT (OUTPATIENT)
Dept: URGENT CARE | Facility: CLINIC | Age: 21
End: 2021-09-09
Payer: COMMERCIAL

## 2021-09-09 VITALS
BODY MASS INDEX: 20.94 KG/M2 | SYSTOLIC BLOOD PRESSURE: 106 MMHG | OXYGEN SATURATION: 100 % | HEIGHT: 63 IN | RESPIRATION RATE: 16 BRPM | TEMPERATURE: 98.7 F | DIASTOLIC BLOOD PRESSURE: 74 MMHG | HEART RATE: 94 BPM | WEIGHT: 118.2 LBS

## 2021-09-09 DIAGNOSIS — R10.12 LEFT UPPER QUADRANT ABDOMINAL PAIN: ICD-10-CM

## 2021-09-09 LAB
APPEARANCE UR: CLEAR
BILIRUB UR STRIP-MCNC: NEGATIVE MG/DL
COLOR UR AUTO: NORMAL
GLUCOSE UR STRIP.AUTO-MCNC: NEGATIVE MG/DL
INT CON NEG: NORMAL
INT CON POS: NORMAL
KETONES UR STRIP.AUTO-MCNC: 80 MG/DL
LEUKOCYTE ESTERASE UR QL STRIP.AUTO: NEGATIVE
NITRITE UR QL STRIP.AUTO: NEGATIVE
PH UR STRIP.AUTO: 5.5 [PH] (ref 5–8)
POC URINE PREGNANCY TEST: NEGATIVE
PROT UR QL STRIP: NEGATIVE MG/DL
RBC UR QL AUTO: NEGATIVE
SP GR UR STRIP.AUTO: 1.03
UROBILINOGEN UR STRIP-MCNC: 0.2 MG/DL

## 2021-09-09 PROCEDURE — 81002 URINALYSIS NONAUTO W/O SCOPE: CPT | Performed by: PHYSICIAN ASSISTANT

## 2021-09-09 PROCEDURE — 99214 OFFICE O/P EST MOD 30 MIN: CPT | Performed by: PHYSICIAN ASSISTANT

## 2021-09-09 PROCEDURE — 81025 URINE PREGNANCY TEST: CPT | Performed by: PHYSICIAN ASSISTANT

## 2021-09-09 RX ORDER — FAMOTIDINE 20 MG/1
20 TABLET, FILM COATED ORAL 2 TIMES DAILY
Qty: 60 TABLET | Refills: 0 | Status: SHIPPED | OUTPATIENT
Start: 2021-09-09

## 2021-09-09 ASSESSMENT — ENCOUNTER SYMPTOMS
ABDOMINAL PAIN: 1
SORE THROAT: 0
FLANK PAIN: 0
BLOOD IN STOOL: 0
MYALGIAS: 1
PALPITATIONS: 0
VOMITING: 0
WEIGHT LOSS: 0
BACK PAIN: 1
NAUSEA: 0
DIZZINESS: 0
SHORTNESS OF BREATH: 0
DIARRHEA: 0
WEAKNESS: 0
COUGH: 0
CHILLS: 0
HEADACHES: 0
HEARTBURN: 0
FEVER: 0
CONSTIPATION: 0
DIAPHORESIS: 0

## 2021-09-09 ASSESSMENT — FIBROSIS 4 INDEX: FIB4 SCORE: 0.35

## 2021-09-09 NOTE — PROGRESS NOTES
Subjective:   Jenn Sky is a 21 y.o. female who presents for Other (pt is having stomach ache on the left side goes to rib and back  x 2 days )      HPI:  This is a very pleasant 21-year-old female presenting to the clinic with intermittent stomach pain x2 weeks.  Patient states pain comes and goes.  It is described as a burning sensation.  Pain starts near the epigastric region and extends up the lateral rib cage to the left shoulder.  She also notices a burning sensation in both nipples.  She denies any rashes or lesions.  Pain is currently rated as a 4/10 at its worse.  She also notices some occasional radiation of pain to her mid back.  No associated nausea or vomiting.  Pain is occasionally aggravated after eating.  No chest pressure or shortness of breath.  No headaches or lightheadedness.  Not experiencing any urinary complaints.  Still having normal bowel movements.  No melena or hematochezia.  She does work out often and at first attributed her symptoms to a musculoskeletal injury.  This however has not resolved over the last 2 weeks.  Denies any history of GERD or PUD.  Denies any new medication changes.  Has not used significant amounts of ibuprofen.  She drinks socially.  No other drug or tobacco use.    Review of Systems   Constitutional: Negative for chills, diaphoresis, fever, malaise/fatigue and weight loss.   HENT: Negative for congestion and sore throat.    Respiratory: Negative for cough and shortness of breath.    Cardiovascular: Negative for chest pain and palpitations.   Gastrointestinal: Positive for abdominal pain. Negative for blood in stool, constipation, diarrhea, heartburn, melena, nausea and vomiting.   Genitourinary: Negative for dysuria, flank pain, frequency, hematuria and urgency.   Musculoskeletal: Positive for back pain and myalgias.   Skin: Negative for rash.   Neurological: Negative for dizziness, weakness and headaches.       Medications:    • This patient does not have an  "active medication from one of the medication groupers.    Allergies: Patient has no known allergies.    Problem List: Jenn Sky does not have a problem list on file.    Surgical History:  No past surgical history on file.    Past Social Hx: Jenn Sky  reports that she has never smoked. She has never used smokeless tobacco. She reports that she does not drink alcohol and does not use drugs.     Past Family Hx:  Jenn Sky family history includes Asthma in her sister.     Problem list, medications, and allergies reviewed by myself today in Epic.     Objective:     /74   Pulse 94   Temp 37.1 °C (98.7 °F) (Temporal)   Resp 16   Ht 1.6 m (5' 3\")   Wt 53.6 kg (118 lb 3.2 oz)   SpO2 100%   BMI 20.94 kg/m²     Physical Exam  Constitutional:       General: She is not in acute distress.     Appearance: Normal appearance. She is not ill-appearing, toxic-appearing or diaphoretic.   HENT:      Head: Normocephalic and atraumatic.      Mouth/Throat:      Mouth: Mucous membranes are moist.      Pharynx: No oropharyngeal exudate or posterior oropharyngeal erythema.   Eyes:      Conjunctiva/sclera: Conjunctivae normal.   Cardiovascular:      Rate and Rhythm: Normal rate and regular rhythm.      Pulses: Normal pulses.      Heart sounds: Normal heart sounds.   Pulmonary:      Effort: Pulmonary effort is normal.      Breath sounds: Normal breath sounds. No wheezing, rhonchi or rales.   Abdominal:      General: Bowel sounds are normal. There is no distension.      Palpations: Abdomen is soft. There is no splenomegaly or mass.      Tenderness: There is no abdominal tenderness. There is no right CVA tenderness, left CVA tenderness, guarding or rebound.      Hernia: No hernia is present.          Comments: Patient has very minimal tenderness to palpation over the epigastric and left upper quadrant.  No rashes or lesions appreciated.  No tenderness to palpation over the sternal costal junction.   Musculoskeletal:         " General: Normal range of motion.      Cervical back: Normal range of motion. No muscular tenderness.      Comments: Full and pain-free limited motion of the left shoulder.   Lymphadenopathy:      Cervical: No cervical adenopathy.   Skin:     General: Skin is warm and dry.      Capillary Refill: Capillary refill takes less than 2 seconds.   Neurological:      General: No focal deficit present.      Mental Status: She is alert and oriented to person, place, and time. Mental status is at baseline.   Psychiatric:         Mood and Affect: Mood normal.         Thought Content: Thought content normal.         Assessment/Plan:     Comments/MDM:     • Differential diagnoses and treatment options were discussed with the patient at length today.  Patient has been experiencing these symptoms intermittently for the last 2 weeks.  Pain seems to come and go at random.  She does believe food slightly aggravates her symptoms.  Described as a burning sensation to the epigastric region and left upper quadrant.  Vaginal burning/radiating pain up to the left shoulder into the back.  Urinalysis was within normal limits.  Negative urine pregnancy.  Patient is not felt nauseous no episodes of vomiting.  No changes in stool.  No melena or hematochezia.  No history of connective tissue disease.  No history of DVT or PE.  No cardiopulmonary history.  No dyspnea with exertion.  Lung sounds are clear to auscultation.  At this time would like to perform an outpatient lab work and follow-up once available.  Encouraged starting Pepcid as well as a bland diet.  Refrain from NSAID use at this time.  May use Tylenol for pain.  ER precautions and red flag symptoms discussed.  Patient verbalized understanding of this.     Diagnosis and associated orders:     1. Left upper quadrant abdominal pain    - famotidine (PEPCID) 20 MG Tab; Take 1 Tablet by mouth 2 times a day.  Dispense: 60 Tablet; Refill: 0  - LIPASE; Future  - CBC WITH DIFFERENTIAL; Future  -  Comp Metabolic Panel; Future  - POCT Urinalysis  - POCT Pregnancy             Differential diagnosis, natural history, supportive care, and indications for immediate follow-up discussed.    Advised the patient to follow-up with the primary care physician for recheck, reevaluation, and consideration of further management.    Please note that this dictation was created using voice recognition software. I have made reasonable attempt to correct obvious errors, but I expect that there are errors of grammar and possibly content that I did not discover before finalizing the note.    This note was electronically signed by FRANCISCO Freeman PA-C

## 2022-03-16 ENCOUNTER — HOSPITAL ENCOUNTER (OUTPATIENT)
Facility: MEDICAL CENTER | Age: 22
End: 2022-03-16
Attending: PHYSICIAN ASSISTANT
Payer: COMMERCIAL

## 2022-03-16 ENCOUNTER — TELEPHONE (OUTPATIENT)
Dept: URGENT CARE | Facility: CLINIC | Age: 22
End: 2022-03-16

## 2022-03-16 ENCOUNTER — OFFICE VISIT (OUTPATIENT)
Dept: URGENT CARE | Facility: CLINIC | Age: 22
End: 2022-03-16
Payer: COMMERCIAL

## 2022-03-16 VITALS
BODY MASS INDEX: 21.33 KG/M2 | HEART RATE: 90 BPM | OXYGEN SATURATION: 97 % | TEMPERATURE: 99.1 F | HEIGHT: 63 IN | DIASTOLIC BLOOD PRESSURE: 80 MMHG | WEIGHT: 120.4 LBS | SYSTOLIC BLOOD PRESSURE: 106 MMHG | RESPIRATION RATE: 16 BRPM

## 2022-03-16 DIAGNOSIS — B97.89 VIRAL RESPIRATORY ILLNESS: ICD-10-CM

## 2022-03-16 DIAGNOSIS — J02.9 SORE THROAT: ICD-10-CM

## 2022-03-16 DIAGNOSIS — J98.8 VIRAL RESPIRATORY ILLNESS: ICD-10-CM

## 2022-03-16 DIAGNOSIS — R05.9 COUGH: ICD-10-CM

## 2022-03-16 LAB
HETEROPH AB SER QL LA: NEGATIVE
INT CON NEG: NORMAL
INT CON NEG: NORMAL
INT CON POS: NORMAL
INT CON POS: NORMAL
S PYO AG THROAT QL: NEGATIVE

## 2022-03-16 PROCEDURE — 99213 OFFICE O/P EST LOW 20 MIN: CPT | Mod: CS | Performed by: PHYSICIAN ASSISTANT

## 2022-03-16 PROCEDURE — U0003 INFECTIOUS AGENT DETECTION BY NUCLEIC ACID (DNA OR RNA); SEVERE ACUTE RESPIRATORY SYNDROME CORONAVIRUS 2 (SARS-COV-2) (CORONAVIRUS DISEASE [COVID-19]), AMPLIFIED PROBE TECHNIQUE, MAKING USE OF HIGH THROUGHPUT TECHNOLOGIES AS DESCRIBED BY CMS-2020-01-R: HCPCS

## 2022-03-16 PROCEDURE — 87880 STREP A ASSAY W/OPTIC: CPT | Performed by: PHYSICIAN ASSISTANT

## 2022-03-16 PROCEDURE — U0005 INFEC AGEN DETEC AMPLI PROBE: HCPCS

## 2022-03-16 PROCEDURE — 87070 CULTURE OTHR SPECIMN AEROBIC: CPT

## 2022-03-16 PROCEDURE — 86308 HETEROPHILE ANTIBODY SCREEN: CPT | Performed by: PHYSICIAN ASSISTANT

## 2022-03-16 RX ORDER — DEXAMETHASONE SODIUM PHOSPHATE 10 MG/ML
10 INJECTION INTRAMUSCULAR; INTRAVENOUS ONCE
Status: COMPLETED | OUTPATIENT
Start: 2022-03-16 | End: 2022-03-16

## 2022-03-16 RX ADMIN — DEXAMETHASONE SODIUM PHOSPHATE 10 MG: 10 INJECTION INTRAMUSCULAR; INTRAVENOUS at 12:47

## 2022-03-16 ASSESSMENT — ENCOUNTER SYMPTOMS
HEADACHES: 0
VOMITING: 0
EYE REDNESS: 0
DIARRHEA: 0
NAUSEA: 0
EYE DISCHARGE: 0
HOARSE VOICE: 1
FEVER: 1
COUGH: 1
MYALGIAS: 1
SHORTNESS OF BREATH: 0
TROUBLE SWALLOWING: 1
SORE THROAT: 1

## 2022-03-16 ASSESSMENT — FIBROSIS 4 INDEX: FIB4 SCORE: 0.35

## 2022-03-16 NOTE — PROGRESS NOTES
Subjective     Jenn Sky is a 21 y.o. female who presents with Pharyngitis (2x days, worse this morning )            Pharyngitis   This is a new problem. Episode onset: x 2 days ago. The problem has been unchanged. The pain is worse on the right side. Maximum temperature: Patient reports an associated low-grade fever. The pain is moderate. Associated symptoms include congestion, coughing, a hoarse voice, a plugged ear sensation and trouble swallowing (The patient reports increased pain with swallowing.). Pertinent negatives include no diarrhea, drooling, ear pain, headaches, shortness of breath or vomiting. She has had no exposure to strep. She has tried NSAIDs for the symptoms.     The patient reports no known exposure to COVID-19.  She reports no additional sick contacts.  The patient is fully vaccinated against COVID-19.    PMH:  has no past medical history on file.  MEDS:   Current Outpatient Medications:   •  famotidine (PEPCID) 20 MG Tab, Take 1 Tablet by mouth 2 times a day. (Patient not taking: Reported on 3/16/2022), Disp: 60 Tablet, Rfl: 0  ALLERGIES: No Known Allergies  SURGHX: No past surgical history on file.  SOCHX:  reports that she has never smoked. She has never used smokeless tobacco. She reports that she does not drink alcohol and does not use drugs.  FH: Family history was reviewed, no pertinent findings to report      Review of Systems   Constitutional: Positive for fever (The patient reports an associated low-grade fever.).   HENT: Positive for congestion, hoarse voice, sore throat and trouble swallowing (The patient reports increased pain with swallowing.). Negative for drooling and ear pain.    Eyes: Negative for discharge and redness.   Respiratory: Positive for cough. Negative for shortness of breath.    Cardiovascular: Negative for chest pain.   Gastrointestinal: Negative for diarrhea, nausea and vomiting.   Musculoskeletal: Positive for myalgias.   Neurological: Negative for headaches.  "             Objective     /80 (BP Location: Left arm, Patient Position: Sitting, BP Cuff Size: Small adult)   Pulse 90   Temp 37.3 °C (99.1 °F) (Temporal)   Resp 16   Ht 1.6 m (5' 3\")   Wt 54.6 kg (120 lb 6.4 oz)   SpO2 97%   BMI 21.33 kg/m²      Physical Exam  Constitutional:       General: She is not in acute distress.     Appearance: Normal appearance. She is not ill-appearing.   HENT:      Head: Normocephalic and atraumatic.      Right Ear: Tympanic membrane, ear canal and external ear normal.      Left Ear: Tympanic membrane, ear canal and external ear normal.      Nose: Nose normal.      Mouth/Throat:      Mouth: Mucous membranes are moist.      Pharynx: Oropharynx is clear. Uvula midline. Posterior oropharyngeal erythema present. No uvula swelling.      Tonsils: No tonsillar exudate or tonsillar abscesses.   Eyes:      Extraocular Movements: Extraocular movements intact.      Conjunctiva/sclera: Conjunctivae normal.   Cardiovascular:      Rate and Rhythm: Normal rate and regular rhythm.      Heart sounds: Normal heart sounds.   Pulmonary:      Effort: Pulmonary effort is normal. No respiratory distress.      Breath sounds: Normal breath sounds. No wheezing.   Musculoskeletal:         General: Normal range of motion.      Cervical back: Normal range of motion and neck supple.   Skin:     General: Skin is warm and dry.   Neurological:      Mental Status: She is alert and oriented to person, place, and time.            Progress:  POCT Rapid Strep: NEGATIVE    POCT Mono: NEGATIVE      COVID-19 PCR - pending     Throat Culture - pending     Decadron 10mg PO given in clinic.   The patient tolerated this medication without immediate side effects.           Assessment & Plan          1. Viral respiratory illness  - SARS-CoV-2 PCR (24 hour In-House): Collect NP swab in [facility]; Future    2. Sore throat  - POCT Rapid Strep A  - POCT Mononucleosis (mono)  - CULTURE THROAT; Future  - dexamethasone (DECADRON) " injection (check route below) 10 mg    3. Cough    The patient's presenting symptoms and physical examinations are consistent with a viral respiratory illness with associated sore throat and cough.  On physical exam, patient erythema to the posterior oropharynx without tonsillar hypertrophy or exudates.  The patient's uvula was midline.  The remainder the patient's physical exam today in clinic was normal.  The patient appears in no acute distress.  The patient's vital signs are stable and within normal limits.  She is afebrile today in clinic.  The patient's POCT rapid strep test today in clinic was negative.  We will culture the patient's throat to rule out other possible bacterial sources.  Discussed likely viral etiology with the patient.  The patient's POCT mono testing was also negative.  Based on patient's presenting symptoms, will test the patient for COVID-19.  Advised patient stay at home under self quarantine per CDC guidelines.  The patient was given Decadron 10 mg p.o. today in clinic for her acute pharyngitis.  Advised the patient to monitor worsening signs and/or symptoms.  Recommend OTC medications and supportive care for symptomatic management.  Recommend the patient follow-up with her PCP as needed.  Discussed return precautions with the patient, and she verbalized understanding.    Differential diagnoses, supportive care, and indications for immediate follow-up discussed with patient.   Instructed to return to clinic or nearest emergency department for any change in condition, further concerns, or worsening of symptoms.    OTC Tylenol or Motrin for fever/discomfort.  OTC cough/cold medication for symptomatic relief  OTC Supportive Care for Congestion - saline nasal spray or neti pot  OTC Supportive Care for Sore Throat - warm salt water gargles, sore throat lozenges, warm lemon water, and/or tea.  Drink plenty of fluids  Advised the patient to stay at home under self-isolation until symptoms have  been present for at least 10 days and are improved, and there has been no fever for at least 72 hours without the use of medications and/or no vomiting or diarrhea for 48 hours.  Follow-up with PCP  Return to clinic or go to the ED if symptoms worsen or fail to improve, or if the patient should develop worsening/increasing cough, congestion, ear pain, sore throat, shortness of breath, wheezing, chest pain, fever/chills, and/or any concerning symptoms.    Discussed plan with the patient, and she agrees to the above.     I personally reviewed prior external notes and test results pertinent to today's visit.  I have independently reviewed and interpreted all diagnostics ordered during this urgent care visit.       Please note that this dictation was created using voice recognition software. I have made every reasonable attempt to correct obvious errors, but I expect that there may be errors of grammar and possibly content that I did not discover before finalizing the note.     This note was electronically signed by Alma Mccullough PA-C

## 2022-03-17 DIAGNOSIS — B97.89 VIRAL RESPIRATORY ILLNESS: ICD-10-CM

## 2022-03-17 DIAGNOSIS — J98.8 VIRAL RESPIRATORY ILLNESS: ICD-10-CM

## 2022-03-17 LAB — COVID ORDER STATUS COVID19: NORMAL

## 2022-03-18 LAB
SARS-COV-2 RNA RESP QL NAA+PROBE: NOTDETECTED
SPECIMEN SOURCE: NORMAL

## 2022-03-19 LAB
BACTERIA SPEC RESP CULT: NORMAL
SIGNIFICANT IND 70042: NORMAL
SITE SITE: NORMAL
SOURCE SOURCE: NORMAL